# Patient Record
Sex: MALE | Race: WHITE | NOT HISPANIC OR LATINO | Employment: FULL TIME | URBAN - METROPOLITAN AREA
[De-identification: names, ages, dates, MRNs, and addresses within clinical notes are randomized per-mention and may not be internally consistent; named-entity substitution may affect disease eponyms.]

---

## 2023-11-06 ENCOUNTER — TELEPHONE (OUTPATIENT)
Dept: PRIMARY CARE | Facility: CLINIC | Age: 73
End: 2023-11-06
Payer: MEDICARE

## 2023-11-06 DIAGNOSIS — I10 PRIMARY HYPERTENSION: Primary | ICD-10-CM

## 2023-11-06 RX ORDER — LISINOPRIL 40 MG/1
40 TABLET ORAL DAILY
COMMUNITY
End: 2023-11-06 | Stop reason: SDUPTHER

## 2023-11-06 RX ORDER — LISINOPRIL 40 MG/1
40 TABLET ORAL DAILY
Qty: 90 TABLET | Refills: 3 | Status: SHIPPED | OUTPATIENT
Start: 2023-11-06 | End: 2024-11-05

## 2023-11-06 RX ORDER — METOPROLOL SUCCINATE 100 MG/1
100 TABLET, EXTENDED RELEASE ORAL DAILY
COMMUNITY
End: 2023-11-27 | Stop reason: SDUPTHER

## 2023-11-06 NOTE — TELEPHONE ENCOUNTER
Rx Refill Request Telephone Encounter    Name:  Duane Kim  :  [id number]  Medication Name:  lisinopril  Dose : 20 mg (?) he didn't remember  Directions : he didn't remember and it wasn't in epic.   Specific Pharmacy location:  [facility] on file  Date of next appointment:  2023  Best number to reach patient:  [phone]

## 2023-11-27 ENCOUNTER — TELEPHONE (OUTPATIENT)
Dept: PRIMARY CARE | Facility: CLINIC | Age: 73
End: 2023-11-27
Payer: MEDICARE

## 2023-11-27 DIAGNOSIS — I10 PRIMARY HYPERTENSION: Primary | ICD-10-CM

## 2023-11-27 RX ORDER — METOPROLOL SUCCINATE 100 MG/1
100 TABLET, EXTENDED RELEASE ORAL DAILY
Qty: 90 TABLET | Refills: 3 | Status: SHIPPED | OUTPATIENT
Start: 2023-11-27 | End: 2024-11-26

## 2023-11-27 NOTE — TELEPHONE ENCOUNTER
Rx Refill Request Telephone Encounter    Name:  Duane Kim  :  [id number]  Medication Name:  metoprolol succinate Xl  Dose : 100 mg 24 hr tablets  Directions : take 1 tablet by mouth once daily  Specific Pharmacy location:  [facility] on file  Date of next appointment:  2023  Best number to reach patient:  [phone]

## 2023-12-08 ENCOUNTER — OFFICE VISIT (OUTPATIENT)
Dept: PRIMARY CARE | Facility: CLINIC | Age: 73
End: 2023-12-08
Payer: MEDICARE

## 2023-12-08 VITALS
OXYGEN SATURATION: 94 % | HEIGHT: 67 IN | HEART RATE: 87 BPM | BODY MASS INDEX: 36.98 KG/M2 | TEMPERATURE: 97.7 F | DIASTOLIC BLOOD PRESSURE: 90 MMHG | SYSTOLIC BLOOD PRESSURE: 161 MMHG | WEIGHT: 235.6 LBS

## 2023-12-08 DIAGNOSIS — I48.0 PAROXYSMAL ATRIAL FIBRILLATION (MULTI): Primary | ICD-10-CM

## 2023-12-08 DIAGNOSIS — I71.21 ANEURYSM OF ASCENDING AORTA WITHOUT RUPTURE (CMS-HCC): ICD-10-CM

## 2023-12-08 DIAGNOSIS — I10 PRIMARY HYPERTENSION: ICD-10-CM

## 2023-12-08 DIAGNOSIS — Z12.11 SCREENING FOR COLON CANCER: ICD-10-CM

## 2023-12-08 DIAGNOSIS — R73.9 HYPERGLYCEMIA: ICD-10-CM

## 2023-12-08 DIAGNOSIS — E66.01 OBESITY, MORBID (MULTI): ICD-10-CM

## 2023-12-08 DIAGNOSIS — R97.20 ELEVATED PSA: ICD-10-CM

## 2023-12-08 PROBLEM — I71.20 THORACIC AORTIC ANEURYSM WITHOUT RUPTURE (CMS-HCC): Status: ACTIVE | Noted: 2023-12-08

## 2023-12-08 PROBLEM — I48.91 ATRIAL FIBRILLATION (MULTI): Status: ACTIVE | Noted: 2023-12-08

## 2023-12-08 PROCEDURE — 1159F MED LIST DOCD IN RCRD: CPT | Performed by: INTERNAL MEDICINE

## 2023-12-08 PROCEDURE — 3080F DIAST BP >= 90 MM HG: CPT | Performed by: INTERNAL MEDICINE

## 2023-12-08 PROCEDURE — 99214 OFFICE O/P EST MOD 30 MIN: CPT | Performed by: INTERNAL MEDICINE

## 2023-12-08 PROCEDURE — 3077F SYST BP >= 140 MM HG: CPT | Performed by: INTERNAL MEDICINE

## 2023-12-08 RX ORDER — AMLODIPINE BESYLATE 5 MG/1
5 TABLET ORAL DAILY
Qty: 90 TABLET | Refills: 3 | Status: SHIPPED | OUTPATIENT
Start: 2023-12-08 | End: 2024-12-07

## 2023-12-08 ASSESSMENT — COLUMBIA-SUICIDE SEVERITY RATING SCALE - C-SSRS
2. HAVE YOU ACTUALLY HAD ANY THOUGHTS OF KILLING YOURSELF?: NO
1. IN THE PAST MONTH, HAVE YOU WISHED YOU WERE DEAD OR WISHED YOU COULD GO TO SLEEP AND NOT WAKE UP?: NO
6. HAVE YOU EVER DONE ANYTHING, STARTED TO DO ANYTHING, OR PREPARED TO DO ANYTHING TO END YOUR LIFE?: NO

## 2023-12-08 ASSESSMENT — LIFESTYLE VARIABLES
AUDIT-C TOTAL SCORE: 1
HOW MANY STANDARD DRINKS CONTAINING ALCOHOL DO YOU HAVE ON A TYPICAL DAY: 1 OR 2
HOW OFTEN DO YOU HAVE SIX OR MORE DRINKS ON ONE OCCASION: NEVER
HOW OFTEN DO YOU HAVE A DRINK CONTAINING ALCOHOL: MONTHLY OR LESS
SKIP TO QUESTIONS 9-10: 1

## 2023-12-08 NOTE — PROGRESS NOTES
"Subjective   Patient ID: Duane Kim is a 73 y.o. male who presents for Follow-up.    HPI      BPs at home 135-150/70. Denies any lightheadedness with the BP meds. Denies headaches. No CP.     HR 60-70s. Denies palpitations.  Hasnt seen Cardiology.  Has not been taking xarelto, unclear why. He didn't have issues while on it.    Didn't see Vascular but had stable CT aorta.    Due to see Urology too.     Review of Systems    Objective   /90   Pulse 87   Temp 36.5 °C (97.7 °F)   Ht 1.702 m (5' 7\")   Wt 107 kg (235 lb 9.6 oz)   SpO2 94%   BMI 36.90 kg/m²     Physical Exam  Constitutional:       Appearance: Normal appearance.   Cardiovascular:      Rate and Rhythm: Normal rate. Rhythm irregular.      Heart sounds: No murmur heard.  Pulmonary:      Effort: Pulmonary effort is normal.      Breath sounds: Normal breath sounds.   Musculoskeletal:      Right lower leg: Edema (trace) present.      Left lower leg: Edema (trace) present.   Neurological:      General: No focal deficit present.      Mental Status: He is alert.      Gait: Gait normal.         Assessment/Plan   Problem List Items Addressed This Visit             ICD-10-CM    Atrial fibrillation (CMS/HCC) - Primary I48.91    Relevant Medications    amLODIPine (Norvasc) 5 mg tablet    rivaroxaban (Xarelto) 20 mg tablet    Other Relevant Orders    Referral to Cardiology    Elevated PSA R97.20    Relevant Orders    Prostate Specific Antigen, Screen    Hypertension I10    Relevant Medications    amLODIPine (Norvasc) 5 mg tablet    Other Relevant Orders    CBC    Comprehensive Metabolic Panel    Lipid Panel    Hyperglycemia R73.9    Relevant Orders    Hemoglobin A1C    Thoracic aortic aneurysm without rupture (CMS/HCC) I71.20    Relevant Orders    Referral to Cardiology    Obesity, morbid (CMS/HCC) E66.01     Other Visit Diagnoses         Codes    Screening for colon cancer     Z12.11    Relevant Orders    Cologuard® colon cancer screening             "   Atrial fibrillation  -CHADSVASC score of 2 (age, HTN) - Restart xarelto, discussed importance of taking.  -Continue metoprolol  -Referred to Cardiology     Basal cell skin cancer - Sees Derm regularly, [facility].     HTN - add amlodipine     Elevated PSA - Seeing Urology, never did prostate MRI. Will recheck PSA and refer back to Urology.     TAA - Seeing Vascular     Health maintenance:  -Colon cancer screening - Colonoscopy over 10 years ago, normal. Cologuard ordered.  -AAA - Abd ultrasound previously ordered by Vascular  -Prostate cancer screening - as above  -Immunizations    -Pneumonia - declined   -Shingles - declined   -Flu - declined   -Covid - declined booster     f/u 3 months [facility]

## 2024-01-16 ENCOUNTER — APPOINTMENT (OUTPATIENT)
Dept: CARDIOLOGY | Facility: CLINIC | Age: 74
End: 2024-01-16
Payer: COMMERCIAL

## 2024-01-16 PROBLEM — R26.9 ABNORMAL GAIT: Status: ACTIVE | Noted: 2024-01-16

## 2024-01-16 PROBLEM — I27.21 PULMONARY ARTERIAL HYPERTENSION (MULTI): Status: ACTIVE | Noted: 2024-01-16
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
## 2024-01-16 PROBLEM — G47.10 HYPERSOMNIA: Status: ACTIVE | Noted: 2024-01-16

## 2024-01-31 ENCOUNTER — OFFICE VISIT (OUTPATIENT)
Dept: CARDIOLOGY | Facility: CLINIC | Age: 74
End: 2024-01-31
Payer: COMMERCIAL

## 2024-01-31 VITALS
DIASTOLIC BLOOD PRESSURE: 85 MMHG | WEIGHT: 232.6 LBS | OXYGEN SATURATION: 98 % | SYSTOLIC BLOOD PRESSURE: 124 MMHG | BODY MASS INDEX: 36.43 KG/M2 | HEART RATE: 76 BPM

## 2024-01-31 DIAGNOSIS — I71.21 ANEURYSM OF ASCENDING AORTA WITHOUT RUPTURE (CMS-HCC): ICD-10-CM

## 2024-01-31 DIAGNOSIS — R31.0 GROSS HEMATURIA: ICD-10-CM

## 2024-01-31 DIAGNOSIS — I48.91 ATRIAL FIBRILLATION, UNSPECIFIED TYPE (MULTI): ICD-10-CM

## 2024-01-31 DIAGNOSIS — E66.3 OVERWEIGHT: ICD-10-CM

## 2024-01-31 DIAGNOSIS — I10 HYPERTENSION, UNSPECIFIED TYPE: Primary | ICD-10-CM

## 2024-01-31 DIAGNOSIS — Z71.89 CARDIAC RISK COUNSELING: ICD-10-CM

## 2024-01-31 PROCEDURE — 3074F SYST BP LT 130 MM HG: CPT | Performed by: INTERNAL MEDICINE

## 2024-01-31 PROCEDURE — 99214 OFFICE O/P EST MOD 30 MIN: CPT | Performed by: INTERNAL MEDICINE

## 2024-01-31 PROCEDURE — 93000 ELECTROCARDIOGRAM COMPLETE: CPT | Performed by: INTERNAL MEDICINE

## 2024-01-31 PROCEDURE — 1036F TOBACCO NON-USER: CPT | Performed by: INTERNAL MEDICINE

## 2024-01-31 PROCEDURE — 3079F DIAST BP 80-89 MM HG: CPT | Performed by: INTERNAL MEDICINE

## 2024-01-31 PROCEDURE — 1159F MED LIST DOCD IN RCRD: CPT | Performed by: INTERNAL MEDICINE

## 2024-01-31 RX ORDER — NAPROXEN SODIUM 220 MG/1
81 TABLET, FILM COATED ORAL DAILY
Qty: 30 TABLET | Refills: 11 | COMMUNITY
Start: 2024-01-31 | End: 2025-01-30

## 2024-01-31 NOTE — PROGRESS NOTES
Referred by Dr. Noble for New Patient Visit (Patient states he is here as a new patient)     History Of Present Illness:    Duane Kim is a 73 y.o. male with HTN/TAA/permanent AFIB presenting for cardiac evaluation.  Patient denies chest pain/SOB/palpitations/dizziness/lightheadedness/edema/claudication    Has been off Xarelto for about 1 month=had blood in urine /stool.Stopped med and bleeding stopped-resumed med and bleeding resumed.    Active=strepper/weights      Past Medical History:  TAA  AFIB    Past Surgical History:  Nasal tumor resection      Social History  No tobacco/drug use  Moderate ETOH use    Family History:     None  Allergies:  Patient has no known allergies.    Outpatient Medications:  Current Outpatient Medications   Medication Instructions    amLODIPine (NORVASC) 5 mg, oral, Daily    lisinopril 40 mg, oral, Daily, as directed    metoprolol succinate XL (TOPROL-XL) 100 mg, oral, Daily    rivaroxaban (XARELTO) 20 mg, oral, Daily, Take with food.        Last Recorded Vitals:  Vitals:    01/31/24 1433   BP: 138/86   BP Location: Left arm   Patient Position: Sitting   BP Cuff Size: Adult   Pulse: 76   SpO2: 98%   Weight: 106 kg (232 lb 9.6 oz)       Physical Exam:  Constitutional:       Appearance: Healthy appearance. Not in distress. Obese.   Neck:      Vascular: No JVR. JVD normal.   Pulmonary:      Effort: Pulmonary effort is normal.      Breath sounds: Normal breath sounds. No wheezing. No rhonchi. No rales.   Chest:      Chest wall: Not tender to palpatation.   Cardiovascular:      PMI at left midclavicular line. Normal rate. Irregularly irregular rhythm. Normal S1. Normal S2.       Murmurs: There is no murmur.      No gallop.  No click. No rub.   Pulses:     Intact distal pulses.   Edema:     Peripheral edema absent.   Abdominal:      General: Abdomen is protuberant. Bowel sounds are normal.      Palpations: Abdomen is soft.      Tenderness: There is no abdominal tenderness.  "  Musculoskeletal: Normal range of motion.         General: No tenderness. Skin:     General: Skin is warm and dry.   Neurological:      General: No focal deficit present.      Mental Status: Alert and oriented to person, place and time.            Last Labs:  CBC -  Lab Results   Component Value Date    WBC 7.9 11/05/2020    HGB 15.8 11/05/2020    HCT 49.3 11/05/2020    MCV 96 11/05/2020     11/05/2020       CMP -  Lab Results   Component Value Date    CALCIUM 9.8 05/06/2021    PROT 6.9 11/05/2020    ALBUMIN 4.1 11/05/2020    AST 16 11/05/2020    ALT 17 11/05/2020    ALKPHOS 74 11/05/2020    BILITOT 1.4 (H) 11/05/2020       LIPID PANEL -   Lab Results   Component Value Date    CHOL 189 11/05/2020    TRIG 136 11/05/2020    HDL 41.6 11/05/2020    CHHDL 4.5 11/05/2020    LDLF 120 (H) 11/05/2020    VLDL 27 11/05/2020       RENAL FUNCTION PANEL -   Lab Results   Component Value Date    GLUCOSE 114 (H) 05/06/2021     05/06/2021    K 4.4 05/06/2021     05/06/2021    CO2 30 05/06/2021    ANIONGAP 11 05/06/2021    BUN 11 05/06/2021    CREATININE 0.75 05/06/2021    CALCIUM 9.8 05/06/2021    ALBUMIN 4.1 11/05/2020        Lab Results   Component Value Date    HGBA1C 5.8 11/05/2020       Last Cardiology Tests:  ECG:      AFIB  Echo:  No results found for this or any previous visit from the past 1095 days.      Ejection Fractions:  No results found for: \"EF\"    Cath:  No results found for this or any previous visit from the past 1095 days.      Stress Test:  No results found for this or any previous visit from the past 1095 days.      Cardiac Imaging:  No results found for this or any previous visit from the past 1095 days.        Lab review: I have personally reviewed the laboratory result(s)     Assessment/Plan   Problem List Items Addressed This Visit       Atrial fibrillation (CMS/[facility])    Overview     Has had AFIB since at least 5/2020 thus permanent at this point and unlikely to regain NSR  Rate " controlled on beta blocker.  Intolerant of Xarelto because of hematuria/melaena.Note stopped med and bleeding stopped but resumed med and bleeding resumed.  Has [id number]-Eucc score 2 thus high rsik for CE stroke-start low dose ASA-refer for Watchman         Relevant Orders    ECG 12 Lead    Hypertension - Primary    Overview     BP well controlled controlled on current meds.  Follow         Thoracic aortic aneurysm without rupture (CMS/HCC)    Overview     3.95 cm per 8/22/2023 CT   Maintain good BP control         Overweight    Overview     Needs weight loss         Gross hematuria    Overview     While on Xarelto-stopped off of it  Also had blood latisha stool  Refer for Watchman           Cardiac risk counseling    Overview     Check lipids         Start daily 81 mg aspirin  Refer to =Watchman        Rico Noble, DO

## 2024-03-08 ENCOUNTER — APPOINTMENT (OUTPATIENT)
Dept: PRIMARY CARE | Facility: CLINIC | Age: 74
End: 2024-03-08
Payer: COMMERCIAL

## 2024-04-30 ENCOUNTER — APPOINTMENT (OUTPATIENT)
Dept: CARDIOLOGY | Facility: CLINIC | Age: 74
End: 2024-04-30
Payer: COMMERCIAL

## 2024-11-09 DIAGNOSIS — I10 PRIMARY HYPERTENSION: ICD-10-CM

## 2024-11-11 RX ORDER — LISINOPRIL 40 MG/1
40 TABLET ORAL DAILY
Qty: 90 TABLET | Refills: 3 | Status: SHIPPED | OUTPATIENT
Start: 2024-11-11 | End: 2025-11-11

## 2024-11-28 DIAGNOSIS — I10 PRIMARY HYPERTENSION: ICD-10-CM

## 2024-12-02 RX ORDER — METOPROLOL SUCCINATE 100 MG/1
100 TABLET, EXTENDED RELEASE ORAL DAILY
Qty: 90 TABLET | Refills: 3 | Status: SHIPPED | OUTPATIENT
Start: 2024-12-02

## 2025-02-18 ENCOUNTER — APPOINTMENT (OUTPATIENT)
Dept: PRIMARY CARE | Facility: CLINIC | Age: 75
End: 2025-02-18

## 2025-02-18 VITALS
HEART RATE: 72 BPM | HEIGHT: 67 IN | WEIGHT: 221 LBS | DIASTOLIC BLOOD PRESSURE: 99 MMHG | OXYGEN SATURATION: 96 % | BODY MASS INDEX: 34.69 KG/M2 | SYSTOLIC BLOOD PRESSURE: 142 MMHG

## 2025-02-18 DIAGNOSIS — I71.21 ANEURYSM OF ASCENDING AORTA WITHOUT RUPTURE (CMS-HCC): ICD-10-CM

## 2025-02-18 DIAGNOSIS — R97.20 ELEVATED PSA: ICD-10-CM

## 2025-02-18 DIAGNOSIS — E78.2 HYPERLIPEMIA, MIXED: ICD-10-CM

## 2025-02-18 DIAGNOSIS — I48.91 ATRIAL FIBRILLATION, UNSPECIFIED TYPE (MULTI): ICD-10-CM

## 2025-02-18 DIAGNOSIS — E66.01 OBESITY, MORBID (MULTI): ICD-10-CM

## 2025-02-18 DIAGNOSIS — I48.0 PAROXYSMAL ATRIAL FIBRILLATION (MULTI): ICD-10-CM

## 2025-02-18 DIAGNOSIS — I10 PRIMARY HYPERTENSION: Primary | ICD-10-CM

## 2025-02-18 DIAGNOSIS — Z12.11 SCREENING FOR COLON CANCER: ICD-10-CM

## 2025-02-18 DIAGNOSIS — R73.9 HYPERGLYCEMIA: ICD-10-CM

## 2025-02-18 DIAGNOSIS — Z00.00 MEDICARE ANNUAL WELLNESS VISIT, SUBSEQUENT: ICD-10-CM

## 2025-02-18 DIAGNOSIS — R26.9 ABNORMAL GAIT: ICD-10-CM

## 2025-02-18 PROBLEM — I27.21 PULMONARY ARTERIAL HYPERTENSION (MULTI): Status: RESOLVED | Noted: 2024-01-16 | Resolved: 2025-02-18

## 2025-02-18 PROCEDURE — 1123F ACP DISCUSS/DSCN MKR DOCD: CPT | Performed by: INTERNAL MEDICINE

## 2025-02-18 PROCEDURE — 1036F TOBACCO NON-USER: CPT | Performed by: INTERNAL MEDICINE

## 2025-02-18 PROCEDURE — 3008F BODY MASS INDEX DOCD: CPT | Performed by: INTERNAL MEDICINE

## 2025-02-18 PROCEDURE — 99214 OFFICE O/P EST MOD 30 MIN: CPT | Performed by: INTERNAL MEDICINE

## 2025-02-18 PROCEDURE — 1159F MED LIST DOCD IN RCRD: CPT | Performed by: INTERNAL MEDICINE

## 2025-02-18 PROCEDURE — 1158F ADVNC CARE PLAN TLK DOCD: CPT | Performed by: INTERNAL MEDICINE

## 2025-02-18 PROCEDURE — 3077F SYST BP >= 140 MM HG: CPT | Performed by: INTERNAL MEDICINE

## 2025-02-18 PROCEDURE — 1170F FXNL STATUS ASSESSED: CPT | Performed by: INTERNAL MEDICINE

## 2025-02-18 PROCEDURE — G0439 PPPS, SUBSEQ VISIT: HCPCS | Performed by: INTERNAL MEDICINE

## 2025-02-18 PROCEDURE — 3080F DIAST BP >= 90 MM HG: CPT | Performed by: INTERNAL MEDICINE

## 2025-02-18 PROCEDURE — G2211 COMPLEX E/M VISIT ADD ON: HCPCS | Performed by: INTERNAL MEDICINE

## 2025-02-18 RX ORDER — AMLODIPINE BESYLATE 5 MG/1
5 TABLET ORAL DAILY
Qty: 90 TABLET | Refills: 1 | Status: SHIPPED | OUTPATIENT
Start: 2025-02-18 | End: 2025-08-17

## 2025-02-18 ASSESSMENT — PATIENT HEALTH QUESTIONNAIRE - PHQ9
2. FEELING DOWN, DEPRESSED OR HOPELESS: NOT AT ALL
1. LITTLE INTEREST OR PLEASURE IN DOING THINGS: NOT AT ALL
SUM OF ALL RESPONSES TO PHQ9 QUESTIONS 1 & 2: 0

## 2025-02-18 ASSESSMENT — ACTIVITIES OF DAILY LIVING (ADL)
TAKING_MEDICATION: INDEPENDENT
GROCERY_SHOPPING: INDEPENDENT
DRESSING: INDEPENDENT
DOING_HOUSEWORK: INDEPENDENT
BATHING: INDEPENDENT
MANAGING_FINANCES: INDEPENDENT

## 2025-02-18 NOTE — PROGRESS NOTES
"Subjective   Patient ID: Duane Kim is a 74 y.o. male who presents for Follow-up and Medicare Annual Wellness Visit Subsequent.    HPI   Not seen in over 1 year  Didn't do labs    Has been watching diet  More exercise  Down 15 pounds in past year.    Not taking amlodipine and xarelto for the past 2 months or so. Reports bleeding issues in the past on it but nothing the past year.    Didn't see Urology or Vascular since I saw him last.    Does report chronic unsteady gait. Denies progressive back pain. Denies pain/tingling/weakness in the legs. Just feels 'unsteady.' Denies a tremor. It is stable, not worse since last time.      Review of Systems    Objective   BP (!) 142/99 (BP Location: Right arm, Patient Position: Sitting)   Pulse 72   Ht 1.702 m (5' 7\")   Wt 100 kg (221 lb)   SpO2 96%   BMI 34.61 kg/m²     Physical Exam  Constitutional:       Appearance: Normal appearance.   Eyes:      Extraocular Movements: Extraocular movements intact.      Pupils: Pupils are equal, round, and reactive to light.   Cardiovascular:      Rate and Rhythm: Normal rate. Rhythm irregular.      Heart sounds: No murmur heard.  Pulmonary:      Effort: Pulmonary effort is normal.      Breath sounds: Normal breath sounds.   Musculoskeletal:      Right lower leg: Edema (trace) present.      Left lower leg: Edema (trace) present.   Neurological:      General: No focal deficit present.      Mental Status: He is alert.      Cranial Nerves: No cranial nerve deficit.      Motor: No weakness.      Gait: Gait normal.         Assessment/Plan   Problem List Items Addressed This Visit             ICD-10-CM    Atrial fibrillation (Multi) I48.91    Relevant Medications    amLODIPine (Norvasc) 5 mg tablet    rivaroxaban (Xarelto) 20 mg tablet    Other Relevant Orders    CBC    Comprehensive Metabolic Panel    Lipid Panel    Elevated PSA R97.20    Relevant Orders    Prostate Specific Antigen    Hypertension - Primary I10    Relevant " Medications    amLODIPine (Norvasc) 5 mg tablet    Hyperglycemia R73.9    Relevant Orders    Hemoglobin A1C    Thoracic aortic aneurysm without rupture (CMS-HCC) I71.20    Relevant Orders    CT angio chest w and wo IV contrast    Obesity, morbid (Multi) E66.01    Abnormal gait R26.9    Relevant Orders    Referral to Physical Therapy    Medicare annual wellness visit, subsequent Z00.00     Other Visit Diagnoses         Codes    Screening for colon cancer     Z12.11    Relevant Orders    Cologuard® colon cancer screening    Hyperlipemia, mixed     E78.2    Relevant Orders    Lipid Panel               Atrial fibrillation  -CHADSVASC score of 2 (age, HTN) - Restart xarelto, discussed importance of taking.  -Continue metoprolol  -Advised to see Cardiology     Basal cell skin cancer - Sees Derm regularly, [facility].     HTN - add amlodipine     Elevated PSA - Saw Urology, never did prostate MRI. Will recheck PSA and refer back to Urology.     TAA - Seeing Vascular, will get CTA    Unsteady gait - referred for PT     Health maintenance:  -Colon cancer screening - Colonoscopy over 10 years ago, normal. Discussed again and Cologuard ordered ordered.  -Prostate cancer screening - as above  -Immunizations    -Pneumonia - declined   -Shingles - declined   -Flu - declined   -Covid - declined booster     f/u 3 months

## 2025-03-26 ENCOUNTER — APPOINTMENT (OUTPATIENT)
Dept: RADIOLOGY | Facility: HOSPITAL | Age: 75
End: 2025-03-26
Payer: MEDICARE

## 2025-03-28 ENCOUNTER — PATIENT OUTREACH (OUTPATIENT)
Dept: PRIMARY CARE | Facility: CLINIC | Age: 75
End: 2025-03-28
Payer: MEDICARE

## 2025-03-28 NOTE — PROGRESS NOTES
Discharge Facility: [facility] Diagnosis: Urinary Retention  Admission Date: 3/21/25  Discharge Date: 3/27/25    PCP Appointment Date: No contact made. Task to office.   Specialist Appointment Date: Unknown  Hospital Encounter and Summary Linked: Yes       Unknown     Two attempts were made to reach patient within two business days after discharge. Voicemail left with contact information for patient to call back with any non-emergent questions or concerns.    Herrera Aquino LPN

## 2025-05-30 ENCOUNTER — APPOINTMENT (OUTPATIENT)
Dept: PRIMARY CARE | Facility: CLINIC | Age: 75
End: 2025-05-30
Payer: MEDICARE

## 2025-06-02 ENCOUNTER — APPOINTMENT (OUTPATIENT)
Dept: PRIMARY CARE | Facility: CLINIC | Age: 75
End: 2025-06-02
Payer: MEDICARE

## 2025-06-02 VITALS
DIASTOLIC BLOOD PRESSURE: 74 MMHG | SYSTOLIC BLOOD PRESSURE: 118 MMHG | OXYGEN SATURATION: 97 % | WEIGHT: 208 LBS | HEIGHT: 67 IN | HEART RATE: 72 BPM | BODY MASS INDEX: 32.65 KG/M2

## 2025-06-02 DIAGNOSIS — I48.0 PAROXYSMAL ATRIAL FIBRILLATION (MULTI): Primary | ICD-10-CM

## 2025-06-02 DIAGNOSIS — R97.20 ELEVATED PSA: ICD-10-CM

## 2025-06-02 DIAGNOSIS — Z13.6 SCREENING FOR CARDIOVASCULAR CONDITION: ICD-10-CM

## 2025-06-02 DIAGNOSIS — I10 PRIMARY HYPERTENSION: ICD-10-CM

## 2025-06-02 DIAGNOSIS — R73.9 HYPERGLYCEMIA: ICD-10-CM

## 2025-06-02 PROBLEM — G43.009 MIGRAINE WITHOUT AURA AND WITHOUT STATUS MIGRAINOSUS, NOT INTRACTABLE: Status: ACTIVE | Noted: 2025-06-02

## 2025-06-02 PROCEDURE — 1036F TOBACCO NON-USER: CPT | Performed by: INTERNAL MEDICINE

## 2025-06-02 PROCEDURE — G2211 COMPLEX E/M VISIT ADD ON: HCPCS | Performed by: INTERNAL MEDICINE

## 2025-06-02 PROCEDURE — 3078F DIAST BP <80 MM HG: CPT | Performed by: INTERNAL MEDICINE

## 2025-06-02 PROCEDURE — 1159F MED LIST DOCD IN RCRD: CPT | Performed by: INTERNAL MEDICINE

## 2025-06-02 PROCEDURE — 99214 OFFICE O/P EST MOD 30 MIN: CPT | Performed by: INTERNAL MEDICINE

## 2025-06-02 PROCEDURE — 3074F SYST BP LT 130 MM HG: CPT | Performed by: INTERNAL MEDICINE

## 2025-06-02 PROCEDURE — 3008F BODY MASS INDEX DOCD: CPT | Performed by: INTERNAL MEDICINE

## 2025-06-02 RX ORDER — FINASTERIDE 5 MG/1
5 TABLET, FILM COATED ORAL NIGHTLY
COMMUNITY
Start: 2025-04-21

## 2025-06-02 RX ORDER — TAMSULOSIN HYDROCHLORIDE 0.4 MG/1
1 CAPSULE ORAL
COMMUNITY
Start: 2025-05-15

## 2025-06-02 ASSESSMENT — PATIENT HEALTH QUESTIONNAIRE - PHQ9
SUM OF ALL RESPONSES TO PHQ9 QUESTIONS 1 & 2: 0
1. LITTLE INTEREST OR PLEASURE IN DOING THINGS: NOT AT ALL
2. FEELING DOWN, DEPRESSED OR HOPELESS: NOT AT ALL

## 2025-06-02 NOTE — PROGRESS NOTES
"Subjective   Patient ID: Duane Kim is a 74 y.o. male who presents for Follow-up.    HPI   Had two admissions in March and April for urinary retention. Has been following with Urology. Had bladder stones s/p lithotripsy. Getting prostate biopsy this coming Friday. Off xarelto since this past Friday for the biopsy but will be resuming.    Down 20 pounds since last visit.  Not much of an appetite during the urinary issues he was having. And now watching the diet.    Pt reports BP controlled at home. No HA, dizziness, CP.    Hasn't done CT angio chest yet.      Review of Systems    Objective   /74 (BP Location: Right arm, Patient Position: Sitting)   Pulse 72   Ht 1.702 m (5' 7\")   Wt 94.3 kg (208 lb)   SpO2 97%   BMI 32.58 kg/m²     Physical Exam  Constitutional:       Appearance: Normal appearance.   Cardiovascular:      Rate and Rhythm: Normal rate. Rhythm irregular.      Heart sounds: No murmur heard.  Pulmonary:      Effort: Pulmonary effort is normal.      Breath sounds: Normal breath sounds.   Musculoskeletal:      Right lower leg: No edema.      Left lower leg: No edema.   Neurological:      General: No focal deficit present.      Mental Status: He is alert.      Gait: Gait normal.         Assessment/Plan   Problem List Items Addressed This Visit           ICD-10-CM    Atrial fibrillation (Multi) - Primary I48.91    Elevated PSA R97.20    Hypertension I10    Relevant Orders    CBC    Comprehensive Metabolic Panel    Hyperglycemia R73.9    Relevant Orders    Hemoglobin A1C     Other Visit Diagnoses         Codes      Screening for cardiovascular condition     Z13.6    Relevant Orders    Lipid Panel             Atrial fibrillation  -CHADSVASC score of 2 (age, HTN) - Continue xarelto-Continue metoprolol  -Advised to see Cardiology     Basal cell skin cancer - Sees Derm regularly, [facility].     HTN - controlled, continue same meds     Elevated PSA - Seeing Urology.     TAA - Seeing Vascular, will " get CTA which is ordered and I advised him importance of scheduling     Health maintenance:  -Colon cancer screening - Colonoscopy over 10 years ago, normal. Discussed and Cologuard ordered last visit.  -Prostate cancer screening - as above  -Immunizations    -Pneumonia - declined   -Shingles - declined     f/u 6 months

## 2025-08-16 DIAGNOSIS — I10 PRIMARY HYPERTENSION: ICD-10-CM

## 2025-08-18 RX ORDER — AMLODIPINE BESYLATE 5 MG/1
5 TABLET ORAL DAILY
Qty: 90 TABLET | Refills: 1 | Status: SHIPPED | OUTPATIENT
Start: 2025-08-18

## 2025-12-01 ENCOUNTER — APPOINTMENT (OUTPATIENT)
Dept: PRIMARY CARE | Facility: CLINIC | Age: 75
End: 2025-12-01
Payer: MEDICARE